# Patient Record
(demographics unavailable — no encounter records)

---

## 2024-11-13 NOTE — PHYSICAL EXAM
[Alert] : alert [Normal Voice/Communication] : normal voice/communication [Healthy Appearing] : healthy appearing [No Acute Distress] : no acute distress [Sclera] : the sclera and conjunctiva were normal [Hearing Threshold Finger Rub Not [place]] : hearing was normal [Normal Lips/Gums] : the lips and gums were normal [Normal Appearance] : the appearance of the neck was normal [No Respiratory Distress] : no respiratory distress [No Acc Muscle Use] : no accessory muscle use [Respiration, Rhythm And Depth] : normal respiratory rhythm and effort [Abdomen Soft] : soft [Oriented To Time, Place, And Person] : oriented to person, place, and time

## 2024-11-13 NOTE — ASSESSMENT
[FreeTextEntry1] : YOVANA JUNIOR is a 71 year old female with a history of pituitary microadenoma, recurrent diverticulitis. HTN, T2DM (7.2 last A1c) on Januvia and metformin, breast cancer s/p lumpectomy on the left and some excision on the right (2011- age 58), Mohs procedures on the face, here for initial cyst clinic evaluation  #Multifocal side branch IPMN - incidental finding MRI 10/7/24, largest 1.4cm in uncinate #Familial pancreatic cancer syndrome - 2 FDR and 1 SDR #Breast ca s/p lumpectomy no genetic testing #additional fam hx of gastric cancer (SDR) # Loose stools with fatty meals / r/o EPI # DM on metformin, januvia # Pituitary microadenoma  Recs: -  Discussed that there are several types of pancreatic cysts that exist and they range from completely benign to malignant; she likely has IPMN, which has a small but possible chance of malignant transformation into pancreatic cancer.  There are certain characteristics of the cysts that may increase the risk of cancer.  In general, the risk ranges from 3% in low risk lesions to 20% for high risk side branch lesions over 10 years, and up to 70% risk with main duct involvement over a period of 15-20 years.  Thus, cyst surveillance is recommended. - Additionally discussed familial pancreas cancer syndrome and that she meets this criteria with two FDRs and 1 SDR; she has elevated risk and should be surveilled for PDAC from this standpoint as well. - The [facility] Pancreas Cyst an High Risk Pancreatic Cancer Screening Program was explained to the patient - we will enroll her in our program for scheduled ongoing surveillance and multidisciplinary discussion. - will plan for baseline EUS now followed by conference discussion and determination for surveillance interval (likely 6 mo MRI after EUS) - CA 19-9 and CEA were normal in 10/24 - will get A1c with CPE by PCP - will get fecal elastase to e/f EPI based on loose stools/urgency with fatty meals - Followup with me/ pancreas clinic after EUS and discussion above

## 2024-11-13 NOTE — HISTORY OF PRESENT ILLNESS
[FreeTextEntry1] : YOVANA JUNIOR is a 71 year old female with a history of pituitary microadenoma, recurrent diverticulitis. HTN, T2DM (7.2 last A1c) on Januvia and metformin, breast cancer s/p lumpectomy on the left and some excision on the right (2011- age 58), Mohs procedures on the face  She is presenting today for incidental finding of pancreatic cyst, found on CT chest obtained for aortic aneurysm evaluation MRI 10/7/24 showed PANCREAS: Numerous cystic lesions throughout the pancreas with connections to the pancreatic duct, the most prominent of which is a multilobulated lesion measuring 1.4 x 1.2 cm in the uncinate process. There is no enhancement within the wall or septations of the pancreatic lesions.   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

## 2025-01-15 NOTE — DISCUSSION/SUMMARY
[FreeTextEntry1] : REASON FOR CONSULT Jimena Govea is a 71-year-old female who was referred by Dr. Maria D Olivo for cancer genetic counseling and risk assessment due to a personal history of breast cancer and family history of pancreatic cancer. She was accompanied by her , Orville.   RELEVANT MEDICAL HISTORY Ms. Govea was diagnosed with left breast cancer (stage 1A IDC ER+/[place]+/HER2-) in  at age 58.  She was treated with lumpectomy 2011 and radiation. Lumpectomy pathology also noted ADH, ALH, intraductal papilloma, and columnar cell changes with atypia. Ms. Govea reported she was diagnosed with a pituitary microadenoma 2024 during a workup for essential tremors and the plan is to have another brain MRI 9 months after her last scan. In addition, Ms. 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   Of note, Ms. Govea was incidentally found to have a pancreas lesion on chest CT with contrast (2024), follow-up MRCP 10/07/2024 confirmed multiple cystic lesions throughout the pancreas that appear to connect to the pancreatic duct, likely representing side branch IPMN. Ms. Govea met with Dr. Olivo who recommended cyst surveillance and an upper EUS which was done 2025. Upper EUS noted some pancreatic parenchymal changes suspicious for early chronic pancreatitis, no masses or concerning lesions, multifocal side branch cysts, likely multifocal IPMN, fatty appearing liver and gallstones. Per Ms. Govea's chart, Dr. Olivo will follow-up with plan for surveillance after presenting at Benign Pancreas Conference and referred Ms. Govea to Dr. Henriquez to follow-up for the fatty liver.   OTHER MEDICAL AND SURGICAL HISTORY: Diabetes. HTN. D&Cs d/t miscarriages. Vaginal polyp (, removed). Right breast biopsy reported benign.   PAST OB/GYN HISTORY: Obstetrical History:  Age at Menarche: 13 Menopausal with LMP at age 50 Age at First Live Birth: 35 Contraceptive Use: No.  Hormone Replacement Therapy: No.  CANCER SCREENING HISTORY:   Breast: Last mammogram and sonogram 2024, normal. Frequency: yearly.  GYN: Last visit 10/2023, normal. Frequency: every 2 years given Medicare coverage. Reported having a TVUS in the past for fibroids but is unsure when her last one was.  Colon: Last colonoscopy 2023, reported noncancerous polyps. Frequency: every 3 years.  Skin: Last exam 2024, had benign lesions frozen off, no concerns. Frequency: yearly. Reported sun exposure.   SOCIAL HISTORY: -	 -	Tobacco-product use: No  FAMILY HISTORY: Maternal and paternal ancestry was reported as [place] ([place] A detailed family history of cancer was ascertained. Relevant diagnoses are detailed below and in the scanned pedigree.   To Ms. Govea's knowledge, no one in the family has had germline testing for cancer susceptibility.   	 	RISK ASSESSMENT: Ms. Govea's personal history of breast cancer and family history of pancreatic cancer is suggestive of an inherited predisposition to breast, pancreas, and related cancers. We recommended genetic testing for a [employer] breast/gyn and pancreatic cancer panel. This test analyzes 25 genes: APC, IASAC, BARD1, BRCA1, BRCA2, BRIP1, CDH1, CDKN2A, CHEK2, EPCAM, MEN1, MLH1, MSH2, MSH6, NF1, PALB2, PMS2, PTEN, RAD51C, RAD51D, STK11, TP53, TSC1, TSC2, VHL.  We discussed the risks, benefits and limitations, and implications of genetic testing. We also discussed the psychosocial implications of genetic testing. Possible test results were reviewed with Ms. Govea, along with associated medical management options. The Genetic Information Non-discrimination Act (CONNER) was also reviewed.   Ms. Govea consented to the above-mentioned genetic testing panel. Blood was drawn in our laboratory and sent to [facility] today.  PLAN:  1.	Blood drawn today will be sent to [facility] for analysis.  2.	We will contact Ms. Govea once the results are available and will schedule a follow-up appointment, as needed. Results generally return in 2-3 weeks from the day the sample is received in the lab.  For any additional questions please call Cancer Genetics at [phone].    Yuval Luis MS, [facility] Genetic Counselor, Cancer Genetics  Caridad Rai (Poppy) Genetic Counseling Intern  CC:  Dr. Maria D Olivo

## 2025-07-30 NOTE — PHYSICAL EXAM
[Alert] : alert [Normal Voice/Communication] : normal voice/communication [Healthy Appearing] : healthy appearing [No Acute Distress] : no acute distress [Sclera] : the sclera and conjunctiva were normal [Hearing Threshold Finger Rub Not [place]] : hearing was normal [Normal Lips/Gums] : the lips and gums were normal [Normal Appearance] : the appearance of the neck was normal [No Respiratory Distress] : no respiratory distress [No Acc Muscle Use] : no accessory muscle use [Respiration, Rhythm And Depth] : normal respiratory rhythm and effort [Abdomen Soft] : soft [Oriented To Time, Place, And Person] : oriented to person, place, and time

## 2025-07-30 NOTE — HISTORY OF PRESENT ILLNESS
[FreeTextEntry1] : YOVANA JUNIOR is a 71 year old female with a history of pituitary microadenoma, recurrent diverticulitis. HTN, T2DM (7.2 last A1c) on Januvia and metformin, breast cancer s/p lumpectomy on the left and some excision on the right (2011- age 58), Mohs procedures on the face, 2 FDR and 1 SDR with pancreas cancer (familial pancreas cancer syndrome)  She is presenting today here for pancreatic cyst/HR PDAC clinic followup  MRI 10/2024 - PANCREAS: Numerous cystic lesions throughout the pancreas with connections to the pancreatic duct, the most prominent of which is a multilobulated lesion measuring 1.4 x 1.2 cm in the uncinate process. There is no enhancement within the wall or septations of the pancreatic lesions.  EUS 1/2025 - early chronic panc changes, 1.1cm septated uncinate cyst and multiple small cysts no HRF  MRI 6/25 - PANCREAS: Multiple small cystic lesions in the pancreas, some demonstrating connection to the adjacent pancreatic duct, largest measuring up to 6 mm in the distal tail of the pancreas and 1.2 cm in the pancreatic uncinate process, consistent with low-grade cystic pancreatic neoplasm such as IPMN. Main pancreatic duct is normal in caliber  She went to cancer genetics - hereditary panc and breast/gyn panel was negative for 25 genes  She feels well Anxious about her pancreas No abd pain/n/v/appetite changes/ stool changes A1c about 7.2 Needs to find endocrinologist for pituitary lesion and for DM management

## 2025-07-30 NOTE — ASSESSMENT
[FreeTextEntry1] : YOVANA JUNIOR is a 71 year old female with a history of pituitary microadenoma, recurrent diverticulitis. HTN, T2DM (7.2 last A1c) on Januvia and metformin, breast cancer s/p lumpectomy on the left and some excision on the right (2011- age 58), Mohs procedures on the face, 2 FDR and 1 SDR with pancreas cancer (familial pancreas cancer syndrome)  #Multifocal IPMN- stable cysts on most recent MRI/MRCP from 6/2025 #Familial pancreatic cancer syndrome (2FRD and 1 SDR), negative genetic workup   Recs: - briefly reviewed cyst and high risk pathophysiology, risk for malignant transformation, need for surveillance, current management guidelines  - reviewed her imaging with her - stable cysts without HRF  - recommending CAPS protocol surveillance-  EUS in 1 year (summer 2026) - reviewed red flag symptoms and to call if any new sx/concerns - follow-up with me or DP in 1 year after EUS completed